# Patient Record
Sex: FEMALE | Race: BLACK OR AFRICAN AMERICAN | NOT HISPANIC OR LATINO | URBAN - METROPOLITAN AREA
[De-identification: names, ages, dates, MRNs, and addresses within clinical notes are randomized per-mention and may not be internally consistent; named-entity substitution may affect disease eponyms.]

---

## 2020-07-25 ENCOUNTER — TELEPHONE ENCOUNTER (OUTPATIENT)
Dept: URBAN - METROPOLITAN AREA CLINIC 13 | Facility: CLINIC | Age: 60
End: 2020-07-25

## 2020-07-25 RX ORDER — POLYETHYLENE GLYCOL 3350, SODIUM CHLORIDE, SODIUM BICARBONATE AND POTASSIUM CHLORIDE WITH LEMON FLAVOR 420; 11.2; 5.72; 1.48 G/4L; G/4L; G/4L; G/4L
TAKE  ML AS DIRECTED MIX CONTENTS PER DIRECTIONS, BEGIN DRINKING 1/2 SOLUTION @ 5P, COMPLETE REMAINDER OF SOLUTION 6HR PRIOR TO PROCEDURE POWDER, FOR SOLUTION ORAL
Qty: 1 | Refills: 0 | OUTPATIENT
Start: 2016-01-15 | End: 2016-03-17

## 2020-07-25 RX ORDER — TAMOXIFEN CITRATE 20 MG/1
TAKE 1 TABLET DAILY TABLET, FILM COATED ORAL
Refills: 0 | OUTPATIENT
Start: 2005-09-06 | End: 2011-08-22

## 2020-07-26 ENCOUNTER — TELEPHONE ENCOUNTER (OUTPATIENT)
Dept: URBAN - METROPOLITAN AREA CLINIC 13 | Facility: CLINIC | Age: 60
End: 2020-07-26

## 2020-07-26 RX ORDER — HYDROCHLOROTHIAZIDE 12.5 MG/1
TAKE 1 TABLET BY MOUTH EVERY DAY TABLET ORAL
Qty: 30 | Refills: 0 | Status: ACTIVE | COMMUNITY
Start: 2020-02-18

## 2020-07-26 RX ORDER — PANTOPRAZOLE SODIUM 40 MG/1
TAKE 1 TABLET 30 MINUTES BEFORE BREAKFAST DAILY TABLET, DELAYED RELEASE ORAL
Qty: 30 | Refills: 11 | Status: ACTIVE | COMMUNITY
Start: 2016-01-15

## 2020-07-26 RX ORDER — DEXTROSE 4 G
TAKE 1 TABLET BY MOUTH EVERY DAY AS NEEDED TABLET,CHEWABLE ORAL
Qty: 30 | Refills: 0 | Status: ACTIVE | COMMUNITY
Start: 2019-04-17

## 2020-07-26 RX ORDER — DEXTROSE 4 G
TAKE 1 TABLET BY MOUTH EVERY DAY TABLET,CHEWABLE ORAL
Qty: 30 | Refills: 0 | Status: ACTIVE | COMMUNITY
Start: 2019-07-29

## 2020-07-26 RX ORDER — HYDROCHLOROTHIAZIDE 12.5 MG/1
TAKE 1 TABLET BY MOUTH EVERY DAY TABLET ORAL
Qty: 30 | Refills: 0 | Status: ACTIVE | COMMUNITY
Start: 2019-07-01

## 2020-07-26 RX ORDER — FLUCONAZOLE 100 MG/1
TAKE 1 TABLET DAILY TABLET ORAL
Qty: 7 | Refills: 0 | Status: ACTIVE | COMMUNITY
Start: 2016-03-24

## 2020-07-26 RX ORDER — CHLORPHENIRAMINE MALEATE, DEXTROMETHORPHAN HYDROBROMIDE AND PHENYLEPHRINE HYDROCHLORIDE 4; 10; 15 MG/5ML; MG/5ML; MG/5ML
GIVE 5 MLS BY MOUTH EVERY 6 HRS AS NEEDED LIQUID ORAL
Qty: 120 | Refills: 0 | Status: ACTIVE | COMMUNITY
Start: 2019-07-29

## 2020-07-26 RX ORDER — DICYCLOMINE HYDROCHLORIDE 10 MG/1
TAKE 1 CAPSULE EVERY 6 HOURS PRN ABDOMINAL PAIN CAPSULE ORAL
Qty: 30 | Refills: 3 | Status: ACTIVE | COMMUNITY
Start: 2016-03-30

## 2022-08-01 ENCOUNTER — OFFICE VISIT (OUTPATIENT)
Dept: URBAN - METROPOLITAN AREA CLINIC 113 | Facility: CLINIC | Age: 62
End: 2022-08-01

## 2022-09-26 ENCOUNTER — OFFICE VISIT (OUTPATIENT)
Dept: URBAN - METROPOLITAN AREA CLINIC 113 | Facility: CLINIC | Age: 62
End: 2022-09-26

## 2022-09-26 RX ORDER — CHLORPHENIRAMINE MALEATE, DEXTROMETHORPHAN HYDROBROMIDE AND PHENYLEPHRINE HYDROCHLORIDE 4; 10; 15 MG/5ML; MG/5ML; MG/5ML
GIVE 5 MLS BY MOUTH EVERY 6 HRS AS NEEDED LIQUID ORAL
Qty: 120 | Refills: 0 | Status: ACTIVE | COMMUNITY
Start: 2019-07-29

## 2022-09-26 RX ORDER — FLUCONAZOLE 100 MG/1
TAKE 1 TABLET DAILY TABLET ORAL
Qty: 7 | Refills: 0 | Status: ACTIVE | COMMUNITY
Start: 2016-03-24

## 2022-09-26 RX ORDER — DICYCLOMINE HYDROCHLORIDE 10 MG/1
TAKE 1 CAPSULE EVERY 6 HOURS PRN ABDOMINAL PAIN CAPSULE ORAL
Qty: 30 | Refills: 3 | Status: ACTIVE | COMMUNITY
Start: 2016-03-30

## 2022-09-26 RX ORDER — DEXTROSE 4 G
TAKE 1 TABLET BY MOUTH EVERY DAY AS NEEDED TABLET,CHEWABLE ORAL
Qty: 30 | Refills: 0 | Status: ACTIVE | COMMUNITY
Start: 2019-04-17

## 2022-09-26 RX ORDER — PANTOPRAZOLE SODIUM 40 MG/1
TAKE 1 TABLET 30 MINUTES BEFORE BREAKFAST DAILY TABLET, DELAYED RELEASE ORAL
Qty: 30 | Refills: 11 | Status: ACTIVE | COMMUNITY
Start: 2016-01-15

## 2022-09-26 RX ORDER — HYDROCHLOROTHIAZIDE 12.5 MG/1
TAKE 1 TABLET BY MOUTH EVERY DAY TABLET ORAL
Qty: 30 | Refills: 0 | Status: ACTIVE | COMMUNITY
Start: 2019-07-01

## 2022-12-20 ENCOUNTER — WEB ENCOUNTER (OUTPATIENT)
Dept: URBAN - METROPOLITAN AREA CLINIC 113 | Facility: CLINIC | Age: 62
End: 2022-12-20

## 2022-12-20 ENCOUNTER — OFFICE VISIT (OUTPATIENT)
Dept: URBAN - METROPOLITAN AREA CLINIC 113 | Facility: CLINIC | Age: 62
End: 2022-12-20
Payer: MEDICARE

## 2022-12-20 VITALS
HEART RATE: 69 BPM | BODY MASS INDEX: 28.99 KG/M2 | DIASTOLIC BLOOD PRESSURE: 81 MMHG | TEMPERATURE: 97.7 F | SYSTOLIC BLOOD PRESSURE: 124 MMHG | WEIGHT: 174 LBS | HEIGHT: 65 IN | RESPIRATION RATE: 18 BRPM

## 2022-12-20 DIAGNOSIS — D12.6 ADENOMATOUS POLYP OF COLON, UNSPECIFIED PART OF COLON: ICD-10-CM

## 2022-12-20 DIAGNOSIS — K21.9 GERD: ICD-10-CM

## 2022-12-20 DIAGNOSIS — K59.01 CONSTIPATION: ICD-10-CM

## 2022-12-20 PROBLEM — 92065004 BENIGN NEOPLASM OF COLON: Status: ACTIVE | Noted: 2022-12-20

## 2022-12-20 PROBLEM — 235595009 GASTROESOPHAGEAL REFLUX DISEASE: Status: ACTIVE | Noted: 2022-12-20

## 2022-12-20 PROBLEM — 14760008 CONSTIPATION: Status: ACTIVE | Noted: 2022-12-20

## 2022-12-20 PROCEDURE — 99212 OFFICE O/P EST SF 10 MIN: CPT | Performed by: INTERNAL MEDICINE

## 2022-12-20 RX ORDER — OMEPRAZOLE 40 MG/1
1 CAPSULE 30 MINUTES BEFORE MORNING MEAL CAPSULE, DELAYED RELEASE ORAL ONCE A DAY
Qty: 90 CAPSULE | Refills: 4

## 2022-12-20 RX ORDER — PANTOPRAZOLE SODIUM 40 MG/1
TAKE 1 TABLET 30 MINUTES BEFORE BREAKFAST DAILY TABLET, DELAYED RELEASE ORAL
Qty: 30 | Refills: 11 | Status: ON HOLD | COMMUNITY
Start: 2016-01-15

## 2022-12-20 RX ORDER — DICYCLOMINE HYDROCHLORIDE 10 MG/1
TAKE 1 CAPSULE EVERY 6 HOURS PRN ABDOMINAL PAIN CAPSULE ORAL
Qty: 30 | Refills: 3 | Status: ON HOLD | COMMUNITY
Start: 2016-03-30

## 2022-12-20 RX ORDER — HYDROCHLOROTHIAZIDE 12.5 MG/1
TAKE 1 TABLET BY MOUTH EVERY DAY TABLET ORAL
Qty: 30 | Refills: 0 | Status: ACTIVE | COMMUNITY
Start: 2019-07-01

## 2022-12-20 RX ORDER — CHLORPHENIRAMINE MALEATE, DEXTROMETHORPHAN HYDROBROMIDE AND PHENYLEPHRINE HYDROCHLORIDE 4; 10; 15 MG/5ML; MG/5ML; MG/5ML
GIVE 5 MLS BY MOUTH EVERY 6 HRS AS NEEDED LIQUID ORAL
Qty: 120 | Refills: 0 | Status: ON HOLD | COMMUNITY
Start: 2019-07-29

## 2022-12-20 RX ORDER — AMLODIPINE BESYLATE 5 MG/1
1 TABLET TABLET ORAL ONCE A DAY
Status: ACTIVE | COMMUNITY

## 2022-12-20 RX ORDER — FLUCONAZOLE 100 MG/1
TAKE 1 TABLET DAILY TABLET ORAL
Qty: 7 | Refills: 0 | Status: ON HOLD | COMMUNITY
Start: 2016-03-24

## 2022-12-20 RX ORDER — OMEPRAZOLE 40 MG/1
1 CAPSULE 30 MINUTES BEFORE MORNING MEAL CAPSULE, DELAYED RELEASE ORAL ONCE A DAY
Status: ACTIVE | COMMUNITY

## 2022-12-20 RX ORDER — DEXTROSE 4 G
TAKE 1 TABLET BY MOUTH EVERY DAY AS NEEDED TABLET,CHEWABLE ORAL
Qty: 30 | Refills: 0 | Status: ON HOLD | COMMUNITY
Start: 2019-04-17

## 2022-12-20 NOTE — HPI-TODAY'S VISIT:
. EGD 2016.  Scattered specks on the esophageal mucosa, mild to moderate.  Biopsies were obtained to rule out candidiasis.  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  Mild gastritis.  Biopsies were notable for chronic inactive gastritis with no H. pylori identified. . Colonoscopy 2016.  Two 5 mm ascending and cecal polyps removed.  Mild descending colonic diverticulosis.  Biopsies were notable for tubular adenomas. . Abdominal ultrasound 2016.  1.4 cm right hepatic hemangioma. . Esophagram 2016.  Normal . CT scan abdomen pelvis with contrast 2016.  Enhancing 7 mm right hepatic lobe lesion.  Hemangioma. . EGD 2011.  Linear duodenal bulb ulcer.  Chronic in appearance.  Mild oozing.  Antral biopsies were notable for H. pylori.  This was subsequently treated. . Hemoglobin electrophoresis normal 2011. . Helicobacter pylori.  2011.  Treated with tetracycline, Pepto-Bismol, Flagyl, and PPI.  She was compliant with therapy. . Esophagram 2007.  Normal . Colonoscopy 2007.  4 distinct 3 mm sessile sigmoid colon polyps.  Biopsies were notable for tubular adenomas and hyperplastic polyps. . Colonoscopy 2005.  1.5 cm sessile cecal polyp removed by snare polypectomy.  Biopsies were notable for tubulovillous adenoma. . Colonoscopy 2000.  4 sessile right colon and left colon polyps were removed.  By hot biopsy.  Biopsies were notable for hyperplastic and tubular adenomas. Labs September 2020.  Hemoglobin 11.4, MCV 71, platelet count 260,000, AST 16, ALT 8.  Iron saturation 17%, iron 52, ferritin 130

## 2022-12-20 NOTE — HPI-TODAY'S VISIT:
Very pleasant 62-year-old female with a history of peptic ulcer disease presents with a recent exacerbation of gastroesophageal reflux disease symptoms. . Recently, she had an exacerbation of her acid reflux problems.  There is no history of peptic ulcer disease.  She saw her primary care doctor.  Omeprazole was prescribed.  This is resulted in prompt resolution of her acid reflux symptoms.  She does not have dysphagia at this time.  Her weight is stable and her appetite is fine.  She has chronic constipation and she takes MiraLAX.  This works fairly well for her.  She has had no rectal bleeding or melena.  There is been no weight loss.  There is been no abdominal pain.

## 2023-04-10 NOTE — PHYSICAL EXAM NECK/THYROID:
Pt reported bp documentation    normal appearance, without tenderness upon palpation, no deformities, trachea midline, no masses , no JVD , no lymphadenopathy. carotid pulse normal

## 2023-10-02 ENCOUNTER — OFFICE VISIT (OUTPATIENT)
Dept: URBAN - METROPOLITAN AREA CLINIC 113 | Facility: CLINIC | Age: 63
End: 2023-10-02

## 2023-10-03 ENCOUNTER — LAB OUTSIDE AN ENCOUNTER (OUTPATIENT)
Dept: URBAN - METROPOLITAN AREA CLINIC 113 | Facility: CLINIC | Age: 63
End: 2023-10-03

## 2023-10-03 ENCOUNTER — OFFICE VISIT (OUTPATIENT)
Dept: URBAN - METROPOLITAN AREA CLINIC 113 | Facility: CLINIC | Age: 63
End: 2023-10-03
Payer: MEDICARE

## 2023-10-03 VITALS
DIASTOLIC BLOOD PRESSURE: 86 MMHG | TEMPERATURE: 97.3 F | BODY MASS INDEX: 28.46 KG/M2 | HEART RATE: 63 BPM | RESPIRATION RATE: 18 BRPM | WEIGHT: 170.8 LBS | SYSTOLIC BLOOD PRESSURE: 159 MMHG | HEIGHT: 65 IN

## 2023-10-03 DIAGNOSIS — K21.9 GERD: ICD-10-CM

## 2023-10-03 DIAGNOSIS — D12.6 ADENOMATOUS POLYP OF COLON, UNSPECIFIED PART OF COLON: ICD-10-CM

## 2023-10-03 DIAGNOSIS — K59.01 CONSTIPATION: ICD-10-CM

## 2023-10-03 PROCEDURE — 99214 OFFICE O/P EST MOD 30 MIN: CPT | Performed by: INTERNAL MEDICINE

## 2023-10-03 RX ORDER — HYDROCHLOROTHIAZIDE 12.5 MG/1
TAKE 1 TABLET BY MOUTH EVERY DAY TABLET ORAL
Qty: 30 | Refills: 0 | Status: ACTIVE | COMMUNITY
Start: 2019-07-01

## 2023-10-03 RX ORDER — OMEPRAZOLE 40 MG/1
1 CAPSULE 30 MINUTES BEFORE MORNING MEAL CAPSULE, DELAYED RELEASE ORAL ONCE A DAY
Qty: 90 CAPSULE | Refills: 4 | Status: ACTIVE | COMMUNITY

## 2023-10-03 RX ORDER — AMLODIPINE BESYLATE 5 MG/1
1 TABLET TABLET ORAL ONCE A DAY
Status: ACTIVE | COMMUNITY

## 2023-10-03 RX ORDER — ATORVASTATIN CALCIUM 20 MG/1
1 TABLET TABLET, FILM COATED ORAL ONCE A DAY
Status: ACTIVE | COMMUNITY

## 2023-10-03 NOTE — HPI-TODAY'S VISIT:
Very pleasant 63-year-old female with a history of peptic ulcer disease presents with a recent exacerbation of gastroesophageal reflux disease symptoms. . Since the time of her last visit her reflux symptoms seem to be better controlled taking omeprazole usually once per day.  Also she has a history of chronic constipation and uses MiraLAX.  No reports of rectal bleeding or melena.  No abdominal pain.  Significantly less dyspepsia now than at the time of her previous office evaluation.  There is been no weight loss.   . Labs June 2023.  Hemoglobin 11.4.  MCV 72, platelet count 270,000.  CMP normal. .

## 2023-10-03 NOTE — HPI-TODAY'S VISIT:
EGD 2016.  Scattered specks on the esophageal mucosa, mild to moderate.  Biopsies were obtained to rule out candidiasis.  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  Mild gastritis.  Biopsies were notable for chronic inactive gastritis with no H. pylori identified. . Colonoscopy 2016.  Two 5 mm ascending and cecal polyps removed.  Mild descending colonic diverticulosis.  Biopsies were notable for tubular adenomas. . Abdominal ultrasound 2016.  1.4 cm right hepatic hemangioma. . Esophagram 2016.  Normal . CT scan abdomen pelvis with contrast 2016.  Enhancing 7 mm right hepatic lobe lesion.  Hemangioma. . EGD 2011.  Linear duodenal bulb ulcer.  Chronic in appearance.  Mild oozing.  Antral biopsies were notable for H. pylori.  This was subsequently treated. . Hemoglobin electrophoresis normal 2011. . Helicobacter pylori.  2011.  Treated with tetracycline, Pepto-Bismol, Flagyl, and PPI.  She was compliant with therapy. . Esophagram 2007.  Normal . Colonoscopy 2007.  4 distinct 3 mm sessile sigmoid colon polyps.  Biopsies were notable for tubular adenomas and hyperplastic polyps. . Colonoscopy 2005.  1.5 cm sessile cecal polyp removed by snare polypectomy.  Biopsies were notable for tubulovillous adenoma. . Colonoscopy 2000.  4 sessile right colon and left colon polyps were removed.  By hot biopsy.  Biopsies were notable for hyperplastic and tubular adenomas. . Labs September 2020.  Hemoglobin 11.4, MCV 71, platelet count 260,000, AST 16, ALT 8.  Iron saturation 17%, iron 52, ferritin 130

## 2023-11-08 ENCOUNTER — TELEPHONE ENCOUNTER (OUTPATIENT)
Dept: URBAN - METROPOLITAN AREA CLINIC 113 | Facility: CLINIC | Age: 63
End: 2023-11-08

## 2023-11-08 RX ORDER — POLYETHYLENE GLYCOL 3350, SODIUM CHLORIDE, SODIUM BICARBONATE, POTASSIUM CHLORIDE 420; 11.2; 5.72; 1.48 G/4L; G/4L; G/4L; G/4L
AS DIRECTED POWDER, FOR SOLUTION ORAL
Refills: 0 | OUTPATIENT
Start: 2023-11-14

## 2023-11-17 ENCOUNTER — OUT OF OFFICE VISIT (OUTPATIENT)
Dept: URBAN - METROPOLITAN AREA SURGERY CENTER 25 | Facility: SURGERY CENTER | Age: 63
End: 2023-11-17
Payer: MEDICARE

## 2023-11-17 ENCOUNTER — DASHBOARD ENCOUNTERS (OUTPATIENT)
Age: 63
End: 2023-11-17

## 2023-11-17 ENCOUNTER — CLAIMS CREATED FROM THE CLAIM WINDOW (OUTPATIENT)
Dept: URBAN - METROPOLITAN AREA CLINIC 4 | Facility: CLINIC | Age: 63
End: 2023-11-17
Payer: MEDICARE

## 2023-11-17 DIAGNOSIS — K63.5 BENIGN COLON POLYP: ICD-10-CM

## 2023-11-17 DIAGNOSIS — Z86.010 ADENOMAS PERSONAL HISTORY OF COLONIC POLYPS: ICD-10-CM

## 2023-11-17 DIAGNOSIS — K57.30 COLON, DIVERTICULOSIS: ICD-10-CM

## 2023-11-17 DIAGNOSIS — D12.5 ADENOMATOUS POLYP OF SIGMOID COLON: ICD-10-CM

## 2023-11-17 DIAGNOSIS — K63.89 OTHER SPECIFIED DISEASES OF INTESTINE: ICD-10-CM

## 2023-11-17 DIAGNOSIS — Z12.11 COLON CANCER SCREENING (HIGH RISK): ICD-10-CM

## 2023-11-17 PROCEDURE — 00811 ANES LWR INTST NDSC NOS: CPT | Performed by: ANESTHESIOLOGY

## 2023-11-17 PROCEDURE — 45380 COLONOSCOPY AND BIOPSY: CPT | Performed by: INTERNAL MEDICINE

## 2023-11-17 PROCEDURE — 88305 TISSUE EXAM BY PATHOLOGIST: CPT | Performed by: PATHOLOGY

## 2023-11-17 PROCEDURE — 00811 ANES LWR INTST NDSC NOS: CPT | Performed by: ANESTHESIOLOGIST ASSISTANT

## 2023-11-17 PROCEDURE — G8907 PT DOC NO EVENTS ON DISCHARG: HCPCS | Performed by: CLINIC/CENTER

## 2023-11-17 PROCEDURE — 45380 COLONOSCOPY AND BIOPSY: CPT | Performed by: CLINIC/CENTER

## 2023-11-17 RX ORDER — ATORVASTATIN CALCIUM 20 MG/1
1 TABLET TABLET, FILM COATED ORAL ONCE A DAY
Status: ACTIVE | COMMUNITY

## 2023-11-17 RX ORDER — OMEPRAZOLE 40 MG/1
1 CAPSULE 30 MINUTES BEFORE MORNING MEAL CAPSULE, DELAYED RELEASE ORAL ONCE A DAY
Qty: 90 CAPSULE | Refills: 4 | Status: ACTIVE | COMMUNITY

## 2023-11-17 RX ORDER — POLYETHYLENE GLYCOL 3350, SODIUM CHLORIDE, SODIUM BICARBONATE, POTASSIUM CHLORIDE 420; 11.2; 5.72; 1.48 G/4L; G/4L; G/4L; G/4L
AS DIRECTED POWDER, FOR SOLUTION ORAL
Refills: 0 | Status: ACTIVE | COMMUNITY
Start: 2023-11-14

## 2023-11-17 RX ORDER — HYDROCHLOROTHIAZIDE 12.5 MG/1
TAKE 1 TABLET BY MOUTH EVERY DAY TABLET ORAL
Qty: 30 | Refills: 0 | Status: ACTIVE | COMMUNITY
Start: 2019-07-01

## 2023-11-17 RX ORDER — AMLODIPINE BESYLATE 5 MG/1
1 TABLET TABLET ORAL ONCE A DAY
Status: ACTIVE | COMMUNITY

## 2025-04-02 ENCOUNTER — ERX REFILL RESPONSE (OUTPATIENT)
Dept: URBAN - METROPOLITAN AREA CLINIC 113 | Facility: CLINIC | Age: 65
End: 2025-04-02

## 2025-04-02 RX ORDER — OMEPRAZOLE 40 MG/1
TAKE 1 CAPSULE BY MOUTH EVERY DAY 30 MINUTES BEFORE MORNING MEAL CAPSULE, DELAYED RELEASE ORAL
Qty: 90 CAPSULE | Refills: 4